# Patient Record
Sex: FEMALE | Race: WHITE
[De-identification: names, ages, dates, MRNs, and addresses within clinical notes are randomized per-mention and may not be internally consistent; named-entity substitution may affect disease eponyms.]

---

## 2020-08-14 ENCOUNTER — HOSPITAL ENCOUNTER (OUTPATIENT)
Dept: HOSPITAL 38 - CC.SDS | Age: 75
End: 2020-08-14
Attending: FAMILY MEDICINE
Payer: MEDICARE

## 2020-08-14 VITALS — HEART RATE: 64 BPM | SYSTOLIC BLOOD PRESSURE: 137 MMHG | DIASTOLIC BLOOD PRESSURE: 77 MMHG

## 2020-08-14 DIAGNOSIS — G47.00: ICD-10-CM

## 2020-08-14 DIAGNOSIS — Q43.8: ICD-10-CM

## 2020-08-14 DIAGNOSIS — Z79.82: ICD-10-CM

## 2020-08-14 DIAGNOSIS — M79.7: ICD-10-CM

## 2020-08-14 DIAGNOSIS — F41.9: ICD-10-CM

## 2020-08-14 DIAGNOSIS — K64.8: ICD-10-CM

## 2020-08-14 DIAGNOSIS — K25.9: ICD-10-CM

## 2020-08-14 DIAGNOSIS — F17.200: ICD-10-CM

## 2020-08-14 DIAGNOSIS — I10: ICD-10-CM

## 2020-08-14 DIAGNOSIS — N89.8: ICD-10-CM

## 2020-08-14 DIAGNOSIS — G25.81: ICD-10-CM

## 2020-08-14 DIAGNOSIS — K63.89: Primary | ICD-10-CM

## 2020-08-14 DIAGNOSIS — Z79.899: ICD-10-CM

## 2020-08-14 DIAGNOSIS — E78.5: ICD-10-CM

## 2020-08-14 DIAGNOSIS — K29.50: ICD-10-CM

## 2020-08-14 DIAGNOSIS — Z88.2: ICD-10-CM

## 2020-08-14 DIAGNOSIS — I25.10: ICD-10-CM

## 2020-08-14 DIAGNOSIS — Z11.59: ICD-10-CM

## 2020-08-14 DIAGNOSIS — K21.9: ICD-10-CM

## 2020-08-14 DIAGNOSIS — H65.111: ICD-10-CM

## 2020-08-14 DIAGNOSIS — Z88.8: ICD-10-CM

## 2020-08-14 DIAGNOSIS — G43.909: ICD-10-CM

## 2020-08-14 DIAGNOSIS — G89.29: ICD-10-CM

## 2020-08-14 PROCEDURE — U0002 COVID-19 LAB TEST NON-CDC: HCPCS

## 2020-08-14 NOTE — OR
DATE OF OPERATION:  08/14/2020

 

PREOPERATIVE DIAGNOSIS:

1. ABDOMINAL PAIN.

2. ALTERED BOWEL HABITS.

 

POSTOPERATIVE DIAGNOSIS:

1. ABDOMINAL PAIN.

2. ALTERED BOWEL HABITS.

 

SURGEON:  Braxton Christina MD

 

PROCEDURE:

1. EGD WITH BIOPSIES X2, CALIN.

2. FULL-LENGTH COLONOSCOPY WITH BIOPSIES X2.

 

ANESTHESIA:  MAC.

 

COMPLICATIONS:  None.

 

SPECIMEN:

1. Pyloric biopsy.

2. Antral biopsy.

3. Antral CALIN.

4. Right-sided colon biopsies x2.

 

FINDINGS:

1. Full-length diagnostic EGD.

2. Antral gastritis with peripyloric erosion/ulcer.

3. Full-length colonoscopy.

4. Diffuse significant melanosis coli.

5. Prominent internal hemorrhoids.

 

RECOMMENDATIONS:  Medical followup with [facility] pending path reports.

 

INDICATIONS:  The patient was in with complaints of her chronic abdominal pain

and altered bowel habits.  [facility] sent her for diagnostic upper and lower

scopes.

 

DESCRIPTION OF PROCEDURE:  The patient was prepped and draped, placed in the

left lateral decubitus position.  A lubricated Olympus gastroscope was inserted

over a bit, advanced to the cricopharyngeus area, and easily intubated into the

esophagus.  The esophageal lining was benign in its entire course.  The Z-line

was crisp at 40 cm.  There was no spontaneous reflux seen.  No distal

esophagitis, stricturing, ulceration, or Reich's changes.  The scope was

advanced into the stomach, through the pylorus, and into the second portion of

the duodenum.  This and the duodenal bulb were grossly benign.  The scope was

brought back into the stomach and retroflexed.  The upper fundus and cardia were

benign.  Most of the fundus upon straightening was unremarkable.  The antrum

showed some active and moderate gastritis extending all the way to the pyloric

area.  There was a small little erosion along the pyloric channel, did do a

biopsy of that and one of the antrum, along with a CLOtest.  Air was then

suctioned and the scope removed without complication.

 

A lubricated Olympus colonoscope was then inserted and with relative ease

advanced to the cecum.  The patient had diffuse melanosis changes.  Her bowel

prep was wonderful, and she was very tortuous.  We were able to get down and

visualize the ileocecal valve and appendiceal orifice.  Upon withdrawal,

throughout the entire length of the colon, the patient had prominent and

moderate-to-severe melanosis coli.  Biopsies in the right side of the colon in

the most affected area were taken.  Throughout the rest of the colon,  no

polyps, masses, ulceration, or bleeding sites.  No obvious vascular

abnormalities or signs of polyp.  The rectal vault appeared benign.

Retroflexion confirmed prominent internal hemorrhoids.  The air was then

suctioned, scope removed without complication.

 

KELTON/LORENE

DD:  08/14/2020 09:01:23

DT:  08/14/2020 13:15:45

Job #:  [id number]